# Patient Record
Sex: FEMALE | Race: WHITE | Employment: OTHER | URBAN - METROPOLITAN AREA
[De-identification: names, ages, dates, MRNs, and addresses within clinical notes are randomized per-mention and may not be internally consistent; named-entity substitution may affect disease eponyms.]

---

## 2022-02-23 ENCOUNTER — OFFICE VISIT (OUTPATIENT)
Dept: ORTHOPEDIC SURGERY | Age: 62
End: 2022-02-23
Payer: COMMERCIAL

## 2022-02-23 VITALS — WEIGHT: 140 LBS | BODY MASS INDEX: 23.32 KG/M2 | HEIGHT: 65 IN

## 2022-02-23 DIAGNOSIS — M76.821 POSTERIOR TIBIAL TENDINITIS OF RIGHT LEG: ICD-10-CM

## 2022-02-23 DIAGNOSIS — M20.21 ACQUIRED HALLUX RIGIDUS, RIGHT: Primary | ICD-10-CM

## 2022-02-23 DIAGNOSIS — M67.01 ACHILLES TENDON CONTRACTURE, RIGHT: ICD-10-CM

## 2022-02-23 DIAGNOSIS — M79.671 RIGHT FOOT PAIN: ICD-10-CM

## 2022-02-23 PROCEDURE — 99203 OFFICE O/P NEW LOW 30 MIN: CPT | Performed by: ORTHOPAEDIC SURGERY

## 2022-02-23 RX ORDER — METHYLPREDNISOLONE 4 MG/1
TABLET ORAL
Qty: 1 DOSE PACK | Refills: 0 | Status: SHIPPED | OUTPATIENT
Start: 2022-02-23 | End: 2022-03-07

## 2022-02-23 NOTE — LETTER
2/23/2022    Patient: Hilary Castillo   YOB: 1960   Date of Visit: 2/23/2022     [facility], [place]  [address] [id number]  Via Fax: [phone]    Dear Lorraine Brenner DO,      Thank you for referring Ms. Porter Mccartney to [facility] for evaluation. My notes for this consultation are attached. If you have questions, please do not hesitate to call me. I look forward to following your patient along with you.       Sincerely,    Lowanda Schilder, MD

## 2022-02-23 NOTE — PROGRESS NOTES
Cele Gardner (: 1960) is a 57 y.o. female, patient,here for evaluation of the following   Chief Complaint   Patient presents with    Foot Pain     has a bunion on the right foot and also was told that she has a wrapped tendon around her great toe         ASSESSMENT/PLAN:  Below is the assessment and plan developed based on review of pertinent history, physical exam, labs, studies, and medications. 1. Acquired hallux rigidus, right  -     REFERRAL TO PHYSICAL THERAPY  -     methylPREDNISolone (MEDROL DOSEPACK) 4 mg tablet; Take 1 Tablet by mouth Specific Days and Specific Times for 6 days, THEN 1 Tablet Specific Days and Specific Times for 6 days. As instructed on packet, Normal, Disp-1 Dose Pack, R-0  2. Right foot pain  -     XR STANDING FOOT RT MIN 3 V; Future  -     methylPREDNISolone (MEDROL DOSEPACK) 4 mg tablet; Take 1 Tablet by mouth Specific Days and Specific Times for 6 days, THEN 1 Tablet Specific Days and Specific Times for 6 days. As instructed on packet, Normal, Disp-1 Dose Pack, R-0  3. Posterior tibial tendinitis of right leg  -     REFERRAL TO PHYSICAL THERAPY  -     methylPREDNISolone (MEDROL DOSEPACK) 4 mg tablet; Take 1 Tablet by mouth Specific Days and Specific Times for 6 days, THEN 1 Tablet Specific Days and Specific Times for 6 days. As instructed on packet, Normal, Disp-1 Dose Pack, R-0  4. Achilles tendon contracture, right  -     REFERRAL TO PHYSICAL THERAPY      Patient has multiple problems at one lower extremity. 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 We discussed the treatment for hallux rigidus which is rigid soled shoes, semirigid arch supports or Baeza's extension type of insoles, activity modification, anti-inflammatory medications and we did prescribe Medrol pack today.   If the Medrol pack works, I do recommend 1 cortisone injection. Once we start thinking about surgery is cheilectomy with or without implant and eventually the end-stage treatment for first MTP joint hallux rigidus/arthritis is arthrodesis. She is not a candidate for arthrodesis at this time. I did recommend returning if she elects to proceed with 1 cortisone injection to the first MTP joint. Finally she also has pain along the posterior medial aspect of ankle and this appears to be in line with the posterior tibial tendon, she does have a good amount of contracture to the Achilles tendon with difficulty dorsiflexion when knee is extended and hindfoot corrected to neutral position. Her arch falls to the flat position when weightbearing. The pain around the medial ankle is posterior tibial tendinitis otherwise the tendon appears intact by exam.  Recommend stretching program for the Achilles tendon and posterior tibial tendon program, physical therapy referral is made today. At length discussion had with patient as she has multiple questions regarding all the problems and I spent approximately 35 minutes with this patient discussing the findings on exam, x-rays, reviewing the treatment options for each problem. No current surgical indications. Return if symptoms worsen or fail to improve, for cortizone injection. Allergies   Allergen Reactions    Flagyl [Metronidazole] Other (comments)     GI upset       Current Outpatient Medications   Medication Sig    methylPREDNISolone (MEDROL DOSEPACK) 4 mg tablet Take 1 Tablet by mouth Specific Days and Specific Times for 6 days, THEN 1 Tablet Specific Days and Specific Times for 6 days. As instructed on packet    pravastatin (PRAVACHOL) 10 mg tablet Take 1 Tab by mouth nightly. Indications: ARTERIOSCLEROTIC VASCULAR DISEASE    ALPRAZolam (XANAX) 0.25 mg tablet Take  by mouth.  aspirin 81 mg chewable tablet Take 1 Tab by mouth daily.  (Patient not taking: Reported on 2/23/2022)    B.infantis-B.ani-B.long-B.bifi 10-15 mg TbEC Take  by mouth daily. (Patient not taking: Reported on 2/23/2022)    estradiol (ESTRING) 2 mg vaginal ring Insert  into vagina now. follow package directions (Patient not taking: Reported on 2/23/2022)    nystatin-triamcinolone (MYCOLOG II) topical cream Apply  to affected area two (2) times a day. (Patient not taking: Reported on 2/23/2022)     No current facility-administered medications for this visit. No past medical history on file. Past Surgical History:   Procedure Laterality Date    HX GYN      exploratory laproscopic of uterus for possible endometriosis    HX GYN      laser cervix    HX HEENT      HX TONSIL AND ADENOIDECTOMY         Family History   Problem Relation Age of Onset    Psychiatric Disorder Mother     Heart Disease Father     Hypertension Father        Social History     Socioeconomic History    Marital status:      Spouse name: Not on file    Number of children: Not on file    Years of education: Not on file    Highest education level: Not on file   Occupational History    Not on file   Tobacco Use    Smoking status: Never Smoker    Smokeless tobacco: Never Used   Substance and Sexual Activity    Alcohol use: Yes     Alcohol/week: 5.8 standard drinks     Types: 7 Glasses of wine per week    Drug use: Not on file    Sexual activity: Yes     Partners: Male   Other Topics Concern    Not on file   Social History Narrative    Not on file     Social Determinants of Health     Financial Resource Strain:     Difficulty of Paying Living Expenses: Not on file   Food Insecurity:     Worried About Running Out of Food in the Last Year: Not on file    Harman of Food in the Last Year: Not on file   Transportation Needs:     Lack of Transportation (Medical): Not on file    Lack of Transportation (Non-Medical):  Not on file   Physical Activity:     Days of Exercise per Week: Not on file    Minutes of Exercise per Session: Not on file   Stress:     Feeling of Stress : Not on file   Social Connections:     Frequency of Communication with Friends and Family: Not on file    Frequency of Social Gatherings with Friends and Family: Not on file    Attends Buddhist Services: Not on file    Active Member of [address] [place] [address] [employer] or Organizations: Not on file    Attends Club or Organization Meetings: Not on file    Marital Status: Not on file   Intimate Partner Violence:     Fear of Current or Ex-Partner: Not on file    Emotionally Abused: Not on file    Physically Abused: Not on file    Sexually Abused: Not on file   Housing Stability:     Unable to Pay for Housing in the Last Year: Not on file    Number of [place] in the Last Year: Not on file    Unstable Housing in the Last Year: Not on file           Vitals:  Ht 5' 4.5\" (1.638 m)   Wt 140 lb (63.5 kg)   BMI 23.66 kg/m²    Body mass index is 23.66 kg/m². ROS     Positive for: Musculoskeletal (right foot )    Negative for: Constitutional, Gastrointestinal, Neurological, Skin, Genitourinary, HENT, Endocrine, Cardiovascular, Eyes, Respiratory, Psychiatric, Allergic/Imm, Heme/Lymph    Last edited by Mago Avery on 2022  8:30 AM. (History)              SUBJECTIVE/OBJECTIVE:  Madeline Valencia (: 1960)   Patient presents today with complaint of right ankle and foot pain that has been a problem for the past 7 weeks the level of pain has gotten worse. States that on Monday she went to see a podiatrist for the same problem, she was told she needed surgery for the bunion and the tendon needed to also be addressed. The patient's pain is sharp, dull, stabbing throbbing burning tight and sore sensation. Symptoms come and go and interferes with sleep. States there is some tingling weakness sensation. Every activity seems to make this worse. She states she is \"very active\" and currently of activities is making it worse.   She has tried rest and taking Mobic which made her ankle and foot feels so much better so she did more activities and seemed to have aggravated more. Because of the severe pain she was experiencing she did see the podiatrist on Monday, February 21, 2022. She is otherwise here for the same problem. She denies any history of diabetes, non-smoker. Has hypertension. Physical Exam  Pleasant well-nourished female , alert and oriented to person, time and place, no acute distress. Mostly normal gait, satisfactory weightbearing stance. Right lower extremity/ankle: Calf soft nontender, tenderness along the posterior medial lower extremity distally and above the medial malleolus in line with the posterior tibial tendon, there is minimal swelling, no ecchymosis, no erythema. There is tightness to the Achilles tendon on attempted dorsiflexion with knee extended, the tendon itself is also slightly tender towards the end at the insertion, there is a negative Richey test, negative ankle squeeze test.  Satisfactory to motion of the ankle when knee flexed. Ligaments are grossly stable. Right foot: There is pes planovalgus position to the foot, there is mild hallux valgus but more prominent first metatarsal head and medial eminence prominence that is mildly tender. The pain is worse with increased dorsiflexion plantarflexion which is limited, decreased dorsiflexion up to about 5 degrees and 10 degrees of plantarflexion and extreme position results in discomfort. The joint space is also tender on exam.  The rest of the foot is nontender which includes the lesser toes, metatarsals, mid foot tarsal bones. Ligaments are grossly stable. Contralateral foot and ankle exam, nontender, no swelling ligaments grossly stable. Normal weightbearing stance. Neurovascular exam intact for light touch sensation, cap refill, dorsalis pedis pulse palpable, flexion/extension strength 5/5.     Skin intact without open wounds, lesions or ulcers, no skin discolorations, normal warmth to skin. Imaging:    XR Results (most recent):  Results from Appointment encounter on 02/23/22    XR STANDING FOOT RT MIN 3 V    Narrative  Right foot standing AP, lateral and oblique shows no acute fractures or dislocations. There is mild hallux valgus, there is still some joint space present at the first MTP joint but prominent medial eminence and dorsal bone spur at first metatarsal head. Also some degenerative changes at the midfoot joints tarsometatarsal first and second, there is a small Suzi's. Normal bone density. An electronic signature was used to authenticate this note.   -- Faizan Ríos MD 160

## 2022-03-16 ENCOUNTER — OFFICE VISIT (OUTPATIENT)
Dept: ORTHOPEDIC SURGERY | Age: 62
End: 2022-03-16
Payer: COMMERCIAL

## 2022-03-16 DIAGNOSIS — M79.671 RIGHT FOOT PAIN: Primary | ICD-10-CM

## 2022-03-16 DIAGNOSIS — M76.821 POSTERIOR TIBIAL TENDINITIS OF RIGHT LEG: ICD-10-CM

## 2022-03-16 PROCEDURE — 97110 THERAPEUTIC EXERCISES: CPT | Performed by: PHYSICAL THERAPIST

## 2022-03-16 PROCEDURE — 97162 PT EVAL MOD COMPLEX 30 MIN: CPT | Performed by: PHYSICAL THERAPIST

## 2022-03-16 NOTE — PROGRESS NOTES
Penelope Corbett (: 1960) is a 57 y.o. Foot Pain       Patient Name: Penelope Corbett  Date:3/16/2022  : 1960  [x]  Patient  Verified  Payor: Kendra Magana / Plan: Herb Mireles / Product Type: Commerical /    Odin Burrows MD  Total Treatment Time (min): 70  Total Timed Codes (min): 45  1:1 Treatment Time ( [address] only): N/A  Visit #: 1 of 15      Treatment Area: Right foot    ASSESSMENT/PLAN:  Below is the assessment and plan developed based on review of pertinent history, physical exam, labs, studies, and medications. Patient comes in today with a diagnosis of right foot and ankle pain and presents with physical therapy deficits including range of motion, mobility, strength, flexibility, and proprioception. She will benefit from a physical therapy program to address above-mentioned deficits. Short-term goals: To become independent with today's prescribed home exercise program in 1 week. Long-term goals: To progress with a physical therapy program so that patient demonstrates improved functional ankle and first toe range of motion and may transition back into a regular exercise program reporting less than 3/10 foot and ankle pain with no limitation in the next 4 to 6 weeks. Patient will be seen 1-2 a week for up to 10 visits  with focus on progressive restoration of range of motion and strength, balance, and functional mobility. Therapeutic applications will include but are not limited to:Manual therapy, joint mobilization, myofascial release, therapeutic exercises. Modalities including ultrasound and electric stimulation heat and ice. Kinesiotape and Thompson taping for joint reeducation and approximation of tissue for neuromuscular reeducation. 1. Right foot pain  2. Posterior tibial tendinitis of right leg      Return in about 1 week (around 3/23/2022). SUBJECTIVE:  HPI  Patient comes in today complaining of a several month history of mostly right toe and foot pain. She recently purchased some new supportive shoes with orthotics and reports some improvement. Typically she has difficulty walking barefoot. She has not been able to exercise regularly since January because of her foot pain. Patient would like to get back to a regular walking program, yoga, hip class, and dance class. She does have exercise equipment at home. She also has a condo at [facility] I would like to be able to walk barefoot in the sand. Patient is  and retired. Please see patient's medical chart for detailed list of current medications as well as significant past medical history. OBJECTIVE:  Evaluation(30 mins)  Mostly normal gait, satisfactory weightbearing stance. Relative difficulty raising on toes on the right lower extremity.     Right lower extremity/ankle: Ankle PROM measures 10 degrees dorsiflexion and 45 degrees of plantarflexion. Ankle inversion and eversion motion is within normal limits. Calf is nontender. Negative with Richey test.  Posterior compartment is soft. Severe flexibility restriction to gastroc. Moderate flexibility restriction 2 soleus. Mild tenderness to Achilles tendon and 2 posterior tibialis at ankle and foot. Ankle is stable with four-way ligamentous testing. Relative difficulty with single-leg stance. Weakness with heel raise. Right foot: There is pes planovalgus position to the foot, there is mild hallux valgus  first MTP joint hypomobile and tender. She is limited with 15 degrees of toe extension and 30 degrees of toe flexion. Painful into endrange toe extension. Some mild crepitus to the MTP joint. The rest of the foot is nontender which includes the lesser toes, metatarsals, mid foot tarsal bones. Ligaments are grossly stable.     Contralateral foot and ankle exam, nontender, no swelling ligaments grossly stable.   Normal weightbearing stance.     Neurovascular exam intact for light touch sensation, cap refill, dorsalis pedis pulse palpable, flexion/extension strength 5/5.     Foot and ankle ability measure: 47%    Manual(mins10)  First MTP mobilization with gentle passive range of motion. Rear foot mobilization with emphasis into dorsiflexion stretching. Exercise(mins 15)  With today's interventions we initiated exercises for foot intrinsic strengthening and range of motion, ankle strengthening and range of motion, and flexibility for patient perform at home on a daily basis. Today's exercises include resisted band ankle inversion, marble pickup, calf stretch, soleus stretch, and ankle alphabet. Game ready ice posttreatment. An electronic signature was used to authenticate this note.   -- Serjio Baeza, PT

## 2022-03-22 ENCOUNTER — OFFICE VISIT (OUTPATIENT)
Dept: ORTHOPEDIC SURGERY | Age: 62
End: 2022-03-22
Payer: COMMERCIAL

## 2022-03-22 DIAGNOSIS — M79.671 RIGHT FOOT PAIN: Primary | ICD-10-CM

## 2022-03-22 DIAGNOSIS — M76.821 POSTERIOR TIBIAL TENDINITIS OF RIGHT LEG: ICD-10-CM

## 2022-03-22 PROCEDURE — 97110 THERAPEUTIC EXERCISES: CPT | Performed by: PHYSICAL THERAPIST

## 2022-03-22 PROCEDURE — 97140 MANUAL THERAPY 1/> REGIONS: CPT | Performed by: PHYSICAL THERAPIST

## 2022-03-22 PROCEDURE — 97035 APP MDLTY 1+ULTRASOUND EA 15: CPT | Performed by: PHYSICAL THERAPIST

## 2022-03-22 NOTE — PROGRESS NOTES
Katty Lopez (: 1960) is a 57 y.o. Foot Pain       Patient Name: Katty Lopez  Date:3/22/2022  : 1960  [x]  Patient  Verified  Payor: Luda Castillo / Plan: Jostin Para / Product Type: Commerical /    Vernestine MD Canelo  Total Treatment Time (min): 65  Total Timed Codes (min): 53  1:1 Treatment Time ( [address] only): N/A  Visit #: 2 of 15      Treatment Area: Right foot and ankle    ASSESSMENT/PLAN:  Below is the assessment and plan developed based on review of pertinent history, physical exam, labs, studies, and medications. Patient appears compliant with her home exercises. She did need some feedback on proper technique. Tolerates today's exercise progression without increased baseline foot or ankle pain. Continue with current plan of care and progress towards long-term goals. 1. Right foot pain  2. Posterior tibial tendinitis of right leg      Return in about 1 week (around 3/29/2022) for continued therapy for foot. SUBJECTIVE:  HPI  Doing her exercises. Ankle may feel little better. OBJECTIVE:  Manual(mins 15)  Extensive soft tissue massage/myofascial release to posterior tibialis combined with passive release. Soft tissue massage myofascial release to gastroc and soleus. Subtalar mobilization with passive range of motion. First MTP mobilization with passive range of motion. Modalities  Ultrasound (8 minutes) posterior tibialis just posterior to medial malleolus at 50% duty cycle and 2.0 MHz.     Game ready ice posttreatment    Exercise(mins 30)  See below exercise chart      PT Exercise Log         Activity/Exercise Date  22     Activity/Exercise  All exercises were supervised with verbal and tactile cues provided were necessary to perform the exercises with 100% accuracy       Recumbent bike [x]       Calf stretch/Slant [x]  three-way   Shuttle heel raise     [x]      Single-leg stance/foam   [x]    [place] board   [x]      Resisted ankle DF [] Resisted ankle inversion   []    Resisted ankle eversion   [x] Prone   Resisted ankle PF   []    Cup Walk   []    Treadmill   []      DF belt stretch []        Marbles []    Stools scoot []    Balance board []    Step downs   []      Lower* []    Bungee walk   []    Fitter   []    Balance board   [x]                          []                              []                            []       An electronic signature was used to authenticate this note.   -- Chadd Wood, PT

## 2022-03-28 ENCOUNTER — OFFICE VISIT (OUTPATIENT)
Dept: ORTHOPEDIC SURGERY | Age: 62
End: 2022-03-28
Payer: COMMERCIAL

## 2022-03-28 DIAGNOSIS — M79.671 RIGHT FOOT PAIN: Primary | ICD-10-CM

## 2022-03-28 DIAGNOSIS — M76.821 POSTERIOR TIBIAL TENDINITIS OF RIGHT LEG: ICD-10-CM

## 2022-03-28 PROCEDURE — 97035 APP MDLTY 1+ULTRASOUND EA 15: CPT | Performed by: PHYSICAL THERAPIST

## 2022-03-28 PROCEDURE — 97110 THERAPEUTIC EXERCISES: CPT | Performed by: PHYSICAL THERAPIST

## 2022-03-28 PROCEDURE — 97140 MANUAL THERAPY 1/> REGIONS: CPT | Performed by: PHYSICAL THERAPIST

## 2022-03-28 NOTE — PROGRESS NOTES
Kenan Mendez (: 1960) is a 57 y.o. Foot Pain       Patient Name: Kenan Mendez  Date:3/28/2022  : 1960  [x]  Patient  Verified  Payor: Halina Qiu / Plan: [employer] / Product Type: Commerical /    Stephanie Shaver MD  Total Treatment Time (min): 65  Total Timed Codes (min): 53  1:1 Treatment Time ( [address] only): N/A  Visit #: 3 of 15      Treatment Area: Right foot and ankle    ASSESSMENT/PLAN:  Below is the assessment and plan developed based on review of pertinent history, physical exam, labs, studies, and medications. Patient responding well to current plan of care. Benefiting from modalities and soft tissue work to posterior tibialis. Continue with current plan of care and progress towards long-term goals. 1. Right foot pain  2. Posterior tibial tendinitis of right leg      Return in about 1 week (around 2022) for continued therapy for foot. SUBJECTIVE:  HPI  Ankle and leg felt amazing after last treatment. OBJECTIVE:  Manual(mins 15)  Extensive soft tissue massage/myofascial release to posterior tibialis combined with passive release. Soft tissue massage myofascial release to gastroc and soleus. Subtalar mobilization with passive range of motion. K tape to posterior tibialis per protocol. Modalities  Ultrasound (8 minutes) posterior tibialis just posterior to medial malleolus at 50% duty cycle and 2.0 MHz.     Game ready ice posttreatment    Exercise(mins 30)  See below exercise chart      PT Exercise Log         Activity/Exercise Date  22     Activity/Exercise  All exercises were supervised with verbal and tactile cues provided were necessary to perform the exercises with 100% accuracy       Recumbent bike [x]       Calf stretch/Slant [x]  three-way   Shuttle heel raise     [x]      Single-leg stance/foam   [x]    [place] board   [x]      Resisted ankle DF []    Resisted ankle inversion   [x] Prone   Resisted ankle eversion   [x]    Resisted ankle PF   []    Cup Walk   []    Treadmill   []      DF belt stretch [x]        Marbles []    Stools scoot []    Balance board []    Step downs   []      Lower* []    Bungee walk   []    Fitter   []    Balance board   [x]                          []                              []                            []       An electronic signature was used to authenticate this note.   -- Rey Kennedy, PT

## 2022-04-04 ENCOUNTER — OFFICE VISIT (OUTPATIENT)
Dept: ORTHOPEDIC SURGERY | Age: 62
End: 2022-04-04
Payer: COMMERCIAL

## 2022-04-04 DIAGNOSIS — M79.671 RIGHT FOOT PAIN: Primary | ICD-10-CM

## 2022-04-04 DIAGNOSIS — M76.821 POSTERIOR TIBIAL TENDINITIS OF RIGHT LEG: ICD-10-CM

## 2022-04-04 PROCEDURE — 97110 THERAPEUTIC EXERCISES: CPT | Performed by: PHYSICAL THERAPIST

## 2022-04-04 PROCEDURE — 97140 MANUAL THERAPY 1/> REGIONS: CPT | Performed by: PHYSICAL THERAPIST

## 2022-04-04 NOTE — PROGRESS NOTES
Cheryln Lefort (: 1960) is a 57 y.o. Foot Pain       Patient Name: Cheryln Lefort  TSKT:3/9/[birth year]27  : 1960  [x]  Patient  Verified  Payor: Cassie Montanez / Plan: Baldo Colvin / Product Type: Commerical /    Gina Abdalla MD  Total Treatment Time (min): 60  Total Timed Codes (min): 45  1:1 Treatment Time ([address] only): N/A  Visit #: 4 of 15      Treatment Area: Right foot and ankle    ASSESSMENT/PLAN:  Below is the assessment and plan developed based on review of pertinent history, physical exam, labs, studies, and medications. No real tenderness to posterior tibialis at ankle or foot. Still has some tenderness and fascial restriction along medial calf. She will be out of town next week. She will continue with at home strengthening and flexibility exercises. We will follow-up in 2 weeks. 1. Right foot pain  2. Posterior tibial tendinitis of right leg      Return in about 2 weeks (around 2022) for continued therapy for foot. SUBJECTIVE:  Foot Pain      Ankle and leg felt amazing after last treatment. OBJECTIVE:  Manual(mins 15)  Extensive soft tissue massage/myofascial release to posterior tibialis combined with passive release. Soft tissue massage myofascial release to gastroc and soleus. Subtalar mobilization with passive range of motion. K tape to posterior tibialis per protocol.     Modalities      Game ready ice posttreatment    Exercise(mins 30)  See below exercise chart      PT Exercise Log         Activity/Exercise Date  22     Activity/Exercise  All exercises were supervised with verbal and tactile cues provided were necessary to perform the exercises with 100% accuracy       Recumbent bike [x]       Calf stretch/Slant [x]  three-way   Shuttle heel raise     [x]      Single-leg stance/foam   [x]    [place] board   [x]      Resisted ankle DF []    Resisted ankle inversion   [x] Prone   Resisted ankle eversion   [x]    Resisted ankle PF   []    Cup Walk []    Treadmill   []      DF belt stretch [x]        Marbles []    Stools scoot []    Balance board []    Step downs   []      Lower* []    Bungee walk   []    Fitter   []    Balance board   [x]                          []                              []                            []       An electronic signature was used to authenticate this note.   -- Naomi Arthur, PT

## 2022-04-21 ENCOUNTER — OFFICE VISIT (OUTPATIENT)
Dept: ORTHOPEDIC SURGERY | Age: 62
End: 2022-04-21
Payer: COMMERCIAL

## 2022-04-21 DIAGNOSIS — M76.821 POSTERIOR TIBIAL TENDINITIS OF RIGHT LEG: ICD-10-CM

## 2022-04-21 DIAGNOSIS — M79.671 RIGHT FOOT PAIN: Primary | ICD-10-CM

## 2022-04-21 PROCEDURE — 97110 THERAPEUTIC EXERCISES: CPT | Performed by: PHYSICAL THERAPIST

## 2022-04-21 PROCEDURE — 97035 APP MDLTY 1+ULTRASOUND EA 15: CPT | Performed by: PHYSICAL THERAPIST

## 2022-04-21 PROCEDURE — 97140 MANUAL THERAPY 1/> REGIONS: CPT | Performed by: PHYSICAL THERAPIST

## 2022-04-21 NOTE — PROGRESS NOTES
Lamar Jennings (: 1960) is a 60 y.o. Foot Pain       Patient Name: Lamar Jennings  Date:2022  : 1960  [x]  Patient  Verified  Payor: Liset Suárez / Plan: Parris Red / Product Type: Commerical /    Santy Maloney MD  Total Treatment Time (min): 60  Total Timed Codes (min): 60  1:1 Treatment Time ([address] only): N/A  Visit #: 5 of 15      Treatment Area: Right foot and ankle    ASSESSMENT/PLAN:  Below is the assessment and plan developed based on review of pertinent history, physical exam, labs, studies, and medications. Mostly tender along her posterior tibialis msLisa LUCAS referred her to [facility], for at home massage. She will continue with at home strengthening and flexibility exercises. We will have her f/u once a week for 2 more weeks and progress toward ltgs. 1. Right foot pain  2. Posterior tibial tendinitis of right leg      Return in about 1 week (around 2022) for continued therapy for foot. SUBJECTIVE:  Foot Pain      Ankle and leg felt amazing after last treatment. OBJECTIVE:  Manual(mins 15)  Extensive soft tissue massage/myofascial release to posterior tibialis combined with passive release. Soft tissue massage myofascial release to gastroc and soleus. Subtalar mobilization with passive range of motion. K tape to posterior tibialis per protocol. Modalities  US(8 mins) Mid posterior tibialis at 50 percent and 1. 0MHZ.     Game ready ice posttreatment    Exercise(mins 30)  See below exercise chart      PT Exercise Log         Activity/Exercise Date  22     Activity/Exercise  All exercises were supervised with verbal and tactile cues provided were necessary to perform the exercises with 100% accuracy       Recumbent bike [x]       Calf stretch/Slant [x]  three-way   Shuttle heel raise     [x]      Single-leg stance/foam   [x]    [place] board   [x]      Resisted ankle DF []    Resisted ankle inversion   [x] Prone   Resisted ankle eversion   [x]    Resisted ankle PF   []    Cup Walk   []    Treadmill   []      DF belt stretch [x]        Marbles []    Stools scoot []    Balance board []    Step downs   []      Lower* []    Bungee walk   []    Fitter   []    Balance board   [x]                          []                              []                            []       An electronic signature was used to authenticate this note.   -- Marie Johnson, PT

## 2023-07-13 PROBLEM — F39 MOOD DISORDER (HCC): Status: ACTIVE | Noted: 2023-07-13

## 2023-12-12 LAB — MAMMOGRAPHY, EXTERNAL: NORMAL

## 2024-01-22 RX ORDER — ROSUVASTATIN CALCIUM 5 MG/1
5 TABLET, COATED ORAL DAILY
Qty: 90 TABLET | Refills: 0 | OUTPATIENT
Start: 2024-01-22

## 2024-04-23 SDOH — ECONOMIC STABILITY: INCOME INSECURITY: HOW HARD IS IT FOR YOU TO PAY FOR THE VERY BASICS LIKE FOOD, HOUSING, MEDICAL CARE, AND HEATING?: NOT HARD AT ALL

## 2024-04-23 SDOH — ECONOMIC STABILITY: HOUSING INSECURITY
IN THE LAST 12 MONTHS, WAS THERE A TIME WHEN YOU DID NOT HAVE A STEADY PLACE TO SLEEP OR SLEPT IN A SHELTER (INCLUDING NOW)?: NO

## 2024-04-23 SDOH — ECONOMIC STABILITY: FOOD INSECURITY: WITHIN THE PAST 12 MONTHS, THE FOOD YOU BOUGHT JUST DIDN'T LAST AND YOU DIDN'T HAVE MONEY TO GET MORE.: NEVER TRUE

## 2024-04-23 SDOH — ECONOMIC STABILITY: TRANSPORTATION INSECURITY
IN THE PAST 12 MONTHS, HAS LACK OF TRANSPORTATION KEPT YOU FROM MEETINGS, WORK, OR FROM GETTING THINGS NEEDED FOR DAILY LIVING?: NO

## 2024-04-23 SDOH — ECONOMIC STABILITY: FOOD INSECURITY: WITHIN THE PAST 12 MONTHS, YOU WORRIED THAT YOUR FOOD WOULD RUN OUT BEFORE YOU GOT MONEY TO BUY MORE.: NEVER TRUE

## 2024-04-25 ENCOUNTER — OFFICE VISIT (OUTPATIENT)
Age: 64
End: 2024-04-25
Payer: MEDICARE

## 2024-04-25 VITALS
HEIGHT: 65 IN | SYSTOLIC BLOOD PRESSURE: 125 MMHG | BODY MASS INDEX: 19.06 KG/M2 | WEIGHT: 114.4 LBS | DIASTOLIC BLOOD PRESSURE: 75 MMHG | RESPIRATION RATE: 18 BRPM | OXYGEN SATURATION: 98 % | HEART RATE: 88 BPM | TEMPERATURE: 98.8 F

## 2024-04-25 DIAGNOSIS — R79.89 LOW SERUM CORTISOL LEVEL: ICD-10-CM

## 2024-04-25 DIAGNOSIS — M85.89 OSTEOPENIA OF MULTIPLE SITES: ICD-10-CM

## 2024-04-25 DIAGNOSIS — D69.6 LOW PLATELET COUNT (HCC): ICD-10-CM

## 2024-04-25 DIAGNOSIS — F41.8 SITUATIONAL ANXIETY: ICD-10-CM

## 2024-04-25 DIAGNOSIS — F51.01 PRIMARY INSOMNIA: ICD-10-CM

## 2024-04-25 DIAGNOSIS — I10 PRIMARY HYPERTENSION: ICD-10-CM

## 2024-04-25 DIAGNOSIS — E78.00 PURE HYPERCHOLESTEROLEMIA: Primary | ICD-10-CM

## 2024-04-25 DIAGNOSIS — R79.89 ELEVATED FERRITIN: ICD-10-CM

## 2024-04-25 PROBLEM — F39 MOOD DISORDER (HCC): Status: RESOLVED | Noted: 2023-07-13 | Resolved: 2024-04-25

## 2024-04-25 PROBLEM — Z86.39 HISTORY OF HIGH CHOLESTEROL: Status: ACTIVE | Noted: 2022-02-01

## 2024-04-25 PROBLEM — M85.80 OSTEOPENIA: Status: ACTIVE | Noted: 2023-10-02

## 2024-04-25 PROCEDURE — 1036F TOBACCO NON-USER: CPT | Performed by: INTERNAL MEDICINE

## 2024-04-25 PROCEDURE — 3078F DIAST BP <80 MM HG: CPT | Performed by: INTERNAL MEDICINE

## 2024-04-25 PROCEDURE — 3017F COLORECTAL CA SCREEN DOC REV: CPT | Performed by: INTERNAL MEDICINE

## 2024-04-25 PROCEDURE — G8427 DOCREV CUR MEDS BY ELIG CLIN: HCPCS | Performed by: INTERNAL MEDICINE

## 2024-04-25 PROCEDURE — G8400 PT W/DXA NO RESULTS DOC: HCPCS | Performed by: INTERNAL MEDICINE

## 2024-04-25 PROCEDURE — G8420 CALC BMI NORM PARAMETERS: HCPCS | Performed by: INTERNAL MEDICINE

## 2024-04-25 PROCEDURE — 3074F SYST BP LT 130 MM HG: CPT | Performed by: INTERNAL MEDICINE

## 2024-04-25 PROCEDURE — 1123F ACP DISCUSS/DSCN MKR DOCD: CPT | Performed by: INTERNAL MEDICINE

## 2024-04-25 PROCEDURE — 99204 OFFICE O/P NEW MOD 45 MIN: CPT | Performed by: INTERNAL MEDICINE

## 2024-04-25 PROCEDURE — 1090F PRES/ABSN URINE INCON ASSESS: CPT | Performed by: INTERNAL MEDICINE

## 2024-04-25 RX ORDER — ESTRADIOL 10 UG/1
INSERT VAGINAL
COMMUNITY
Start: 2019-08-26

## 2024-04-25 RX ORDER — ALPRAZOLAM 0.5 MG/1
0.5 TABLET ORAL PRN
COMMUNITY

## 2024-04-25 RX ORDER — TRAZODONE HYDROCHLORIDE 50 MG/1
50 TABLET ORAL NIGHTLY
Qty: 90 TABLET | Refills: 1 | Status: SHIPPED | OUTPATIENT
Start: 2024-04-25

## 2024-04-25 ASSESSMENT — PATIENT HEALTH QUESTIONNAIRE - PHQ9
1. LITTLE INTEREST OR PLEASURE IN DOING THINGS: NOT AT ALL
SUM OF ALL RESPONSES TO PHQ QUESTIONS 1-9: 0
2. FEELING DOWN, DEPRESSED OR HOPELESS: NOT AT ALL
SUM OF ALL RESPONSES TO PHQ9 QUESTIONS 1 & 2: 0

## 2024-04-25 ASSESSMENT — ENCOUNTER SYMPTOMS
COUGH: 0
ABDOMINAL PAIN: 0
SHORTNESS OF BREATH: 0

## 2024-04-25 NOTE — ASSESSMENT & PLAN NOTE
DEXA 10/2023  Spine -1.1  LFN -1.3  RFN -1.5    Continue lifestyle measures, recheck 2-3 year tejas

## 2024-04-25 NOTE — ASSESSMENT & PLAN NOTE
She had low cortisol testing from functional medicine doc  Suggested having endo eval before starting supplements

## 2024-04-25 NOTE — PROGRESS NOTES
Chief Complaint   Patient presents with    New Patient    Establish Care    Abdominal Pain     Blood pressure 125/75, pulse 88, temperature 98.8 °F (37.1 °C), temperature source Oral, resp. rate 18, height 1.651 m (5' 5\"), weight 51.9 kg (114 lb 6.4 oz), SpO2 98 %.    
CBC with Auto Differential; Future         Return in about 6 months (around 10/25/2024) for annual medicare wellness exam, or sooner as needed.       SUBJECTIVE/OBJECTIVE:  New patient to me, here to establish care  Visit spent in discussion of past and current medical conditions, with statuses as documented in Assessment and Plan section    She has really been focused on improving her health over past 2 years. She lost weight by cutting out alcohol, sugar, eating more plant based and exercising    Also seeing a functional medicine provider Dr Butler      Review of Systems   Constitutional:  Negative for chills and fever.   Respiratory:  Negative for cough and shortness of breath.    Cardiovascular:  Negative for chest pain, palpitations and leg swelling.   Gastrointestinal:  Negative for abdominal pain.   Skin:  Negative for rash.   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 Negative for dysphoric mood.           Physical Exam  Constitutional:       General: She is not in acute distress.     Appearance: Normal appearance. She is not ill-appearing.   HENT:      Head: Normocephalic and atraumatic.   Eyes:      Conjunctiva/sclera: Conjunctivae normal.   Cardiovascular:      Rate and Rhythm: Normal rate and regular rhythm.      Heart sounds: No murmur heard.  Pulmonary:      Effort: Pulmonary effort is normal. No respiratory distress.      Breath sounds: Normal breath sounds.   Musculoskeletal:      Cervical back: Normal range of motion.      Right lower leg: No edema.      Left lower leg: No edema.   Skin:     General: Skin is warm and dry.   Neurological:      General: No focal deficit present.      Mental Status: She is alert and oriented to person, place, and time. Mental status is at baseline.      Gait: Gait normal.   Psychiatric:         Mood and Affect: Mood normal.         Behavior: Behavior normal.          Vitals:    04/25/24 1427 
(4) no limitation

## 2024-04-25 NOTE — ASSESSMENT & PLAN NOTE
Persistent insomnia, OTCs werent helpful nor hydroxyzine  Suggest trial of trazodone, New med dosing and potential side effects discussed

## 2024-04-25 NOTE — ASSESSMENT & PLAN NOTE
CAC 2022 was 4, started on rosuvastatin 5mg in 2022  Had normal stress testing and echo at that time as well with Dr Newman  [place] she was getting some fatigue and leg pains related to the statin which then improved with stopping about a month ago  Following plant based diet and exercising regularly  Will get new lipid profile now off the statin and reassess

## 2024-04-29 ENCOUNTER — HOSPITAL ENCOUNTER (OUTPATIENT)
Facility: HOSPITAL | Age: 64
Discharge: HOME | End: 2024-05-02

## 2024-04-29 DIAGNOSIS — D69.6 LOW PLATELET COUNT (HCC): ICD-10-CM

## 2024-04-29 DIAGNOSIS — E78.00 PURE HYPERCHOLESTEROLEMIA: ICD-10-CM

## 2024-04-29 DIAGNOSIS — R79.89 ELEVATED FERRITIN: ICD-10-CM

## 2024-04-29 LAB
BASOPHILS # BLD: 0 K/UL (ref 0–0.1)
BASOPHILS NFR BLD: 1 % (ref 0–1)
CHOLEST SERPL-MCNC: 143 MG/DL
DIFFERENTIAL METHOD BLD: ABNORMAL
EOSINOPHIL # BLD: 0.1 K/UL (ref 0–0.4)
EOSINOPHIL NFR BLD: 4 % (ref 0–7)
ERYTHROCYTE [DISTWIDTH] IN BLOOD BY AUTOMATED COUNT: 12.2 % (ref 11.5–14.5)
HCT VFR BLD AUTO: 39.7 % (ref 35–47)
HDLC SERPL-MCNC: 50 MG/DL
HDLC SERPL: 2.9 (ref 0–5)
HGB BLD-MCNC: 13.7 G/DL (ref 11.5–16)
IMM GRANULOCYTES # BLD AUTO: 0 K/UL (ref 0–0.04)
IMM GRANULOCYTES NFR BLD AUTO: 0 % (ref 0–0.5)
LDLC SERPL CALC-MCNC: 78.8 MG/DL (ref 0–100)
LYMPHOCYTES # BLD: 1 K/UL (ref 0.8–3.5)
LYMPHOCYTES NFR BLD: 35 % (ref 12–49)
MCH RBC QN AUTO: 32.5 PG (ref 26–34)
MCHC RBC AUTO-ENTMCNC: 34.5 G/DL (ref 30–36.5)
MCV RBC AUTO: 94.1 FL (ref 80–99)
MONOCYTES # BLD: 0.2 K/UL (ref 0–1)
MONOCYTES NFR BLD: 8 % (ref 5–13)
NEUTS SEG # BLD: 1.5 K/UL (ref 1.8–8)
NEUTS SEG NFR BLD: 52 % (ref 32–75)
NRBC # BLD: 0 K/UL (ref 0–0.01)
NRBC BLD-RTO: 0 PER 100 WBC
PLATELET # BLD AUTO: 131 K/UL (ref 150–400)
PMV BLD AUTO: 11.9 FL (ref 8.9–12.9)
RBC # BLD AUTO: 4.22 M/UL (ref 3.8–5.2)
TRIGL SERPL-MCNC: 71 MG/DL
VLDLC SERPL CALC-MCNC: 14.2 MG/DL
WBC # BLD AUTO: 2.9 K/UL (ref 3.6–11)

## 2024-05-23 LAB
HFE GENE MUT ANL BLD/T: NORMAL
REVIEWED BY: NORMAL

## 2024-08-31 LAB
HBA1C MFR BLD HPLC: 5.1 %
LDL CHOLESTEROL, EXTERNAL: 83

## 2024-10-27 SDOH — HEALTH STABILITY: PHYSICAL HEALTH: ON AVERAGE, HOW MANY MINUTES DO YOU ENGAGE IN EXERCISE AT THIS LEVEL?: 60 MIN

## 2024-10-27 SDOH — HEALTH STABILITY: PHYSICAL HEALTH: ON AVERAGE, HOW MANY DAYS PER WEEK DO YOU ENGAGE IN MODERATE TO STRENUOUS EXERCISE (LIKE A BRISK WALK)?: 4 DAYS

## 2024-10-27 ASSESSMENT — LIFESTYLE VARIABLES
HOW MANY STANDARD DRINKS CONTAINING ALCOHOL DO YOU HAVE ON A TYPICAL DAY: PATIENT DOES NOT DRINK
HOW OFTEN DO YOU HAVE A DRINK CONTAINING ALCOHOL: 1
HOW OFTEN DO YOU HAVE A DRINK CONTAINING ALCOHOL: NEVER
HOW OFTEN DO YOU HAVE SIX OR MORE DRINKS ON ONE OCCASION: 1
HOW MANY STANDARD DRINKS CONTAINING ALCOHOL DO YOU HAVE ON A TYPICAL DAY: 0

## 2024-10-27 ASSESSMENT — PATIENT HEALTH QUESTIONNAIRE - PHQ9
SUM OF ALL RESPONSES TO PHQ9 QUESTIONS 1 & 2: 0
1. LITTLE INTEREST OR PLEASURE IN DOING THINGS: NOT AT ALL
SUM OF ALL RESPONSES TO PHQ QUESTIONS 1-9: 0
2. FEELING DOWN, DEPRESSED OR HOPELESS: NOT AT ALL
SUM OF ALL RESPONSES TO PHQ QUESTIONS 1-9: 0

## 2024-10-30 ENCOUNTER — OFFICE VISIT (OUTPATIENT)
Age: 64
End: 2024-10-30
Payer: MEDICARE

## 2024-10-30 VITALS
SYSTOLIC BLOOD PRESSURE: 137 MMHG | RESPIRATION RATE: 16 BRPM | WEIGHT: 114.2 LBS | HEIGHT: 64 IN | HEART RATE: 74 BPM | DIASTOLIC BLOOD PRESSURE: 82 MMHG | OXYGEN SATURATION: 98 % | BODY MASS INDEX: 19.5 KG/M2 | TEMPERATURE: 97.6 F

## 2024-10-30 DIAGNOSIS — Z00.00 WELCOME TO MEDICARE PREVENTIVE VISIT: Primary | ICD-10-CM

## 2024-10-30 DIAGNOSIS — D69.6 THROMBOCYTOPENIA (HCC): ICD-10-CM

## 2024-10-30 DIAGNOSIS — E78.00 PURE HYPERCHOLESTEROLEMIA: ICD-10-CM

## 2024-10-30 DIAGNOSIS — Z12.11 COLON CANCER SCREENING: ICD-10-CM

## 2024-10-30 DIAGNOSIS — J30.2 SEASONAL ALLERGIES: ICD-10-CM

## 2024-10-30 DIAGNOSIS — M85.89 OSTEOPENIA OF MULTIPLE SITES: ICD-10-CM

## 2024-10-30 PROBLEM — R79.89 LOW SERUM CORTISOL LEVEL: Status: RESOLVED | Noted: 2024-04-25 | Resolved: 2024-10-30

## 2024-10-30 PROBLEM — Z14.8 HEMOCHROMATOSIS CARRIER: Status: ACTIVE | Noted: 2024-10-30

## 2024-10-30 PROCEDURE — 93005 ELECTROCARDIOGRAM TRACING: CPT | Performed by: INTERNAL MEDICINE

## 2024-10-30 NOTE — ASSESSMENT & PLAN NOTE
Mildly decreased WBCs and platelets with normal hemoglobin, no symptoms of concern, stable over past year+  Getting labs monitored with functional med every 6 months, if not progressing does not likely need heme consult

## 2024-10-30 NOTE — PATIENT INSTRUCTIONS
medicines. These can increase your chances of quitting for good. Quitting is one of the most important things you can do to protect your heart. It is never too late to quit. Try to avoid secondhand smoke too.     Stay at a weight that's healthy for you. Talk to your doctor if you need help losing weight.     Try to get 7 to 9 hours of sleep each night.     Limit alcohol to 2 drinks a day for men and 1 drink a day for women. Too much alcohol can cause health problems.     Manage other health problems such as diabetes, high blood pressure, and high cholesterol. If you think you may have a problem with alcohol or drug use, talk to your doctor.   Medicines    Take your medicines exactly as prescribed. Call your doctor if you think you are having a problem with your medicine.     If your doctor recommends aspirin, take the amount directed each day. Make sure you take aspirin and not another kind of pain reliever, such as acetaminophen (Tylenol).   When should you call for help?   Call 911 if you have symptoms of a heart attack. These may include:    Chest pain or pressure, or a strange feeling in the chest.     Sweating.     Shortness of breath.     Pain, pressure, or a strange feeling in the back, neck, jaw, or upper belly or in one or both shoulders or arms.     Lightheadedness or sudden weakness.     A fast or irregular heartbeat.   After you call 911, the  may tell you to chew 1 adult-strength or 2 to 4 low-dose aspirin. Wait for an ambulance. Do not try to drive yourself.  Watch closely for changes in your health, and be sure to contact your doctor if you have any problems.  Where can you learn more?  Go to https://www.[employer].net/patientEd and enter F075 to learn more about \"A Healthy Heart: Care Instructions.\"  Current as of: June 24, 2023  Content Version: 14.2  © 2024 [employer].   Care instructions adapted under license by [employer]. If you have questions about a medical condition or

## 2024-10-30 NOTE — PROGRESS NOTES
Judgment normal.                 Allergies   Allergen Reactions    Metronidazole Other (See Comments)     GI upset  Other reaction(s): GI Intolerance  GI upset       Prior to Visit Medications    Medication Sig Taking? Authorizing Provider   IMVEXXY MAINTENANCE PACK 10 MCG INST  Yes Jose Luis Avelar MD   ALPRAZolam (XANAX) 0.5 MG tablet Take 1 tablet by mouth as needed. Yes Jose Luis Avelar MD   vitamin D (ERGOCALCIFEROL) 1.25 MG (43747 UT) CAPS capsule TAKE 1 CAPSULE BY MOUTH ONCE WEEKLY 90 DAYS Yes sMelany MD   Cobalamin Combinations (B-12) 100-5000 MCG SUBL Take 3,000 mcg by mouth daily Patient is taking twice a week only Yes Jose Luis Avelar MD   Rosuvastatin Calcium 5 MG CPSP 4 days per week Yes Jose Luis Avelar MD       CareTeam (Including outside providers/suppliers regularly involved in providing care):   Patient Care Team:  Renetta Elmore MD as PCP - General (Internal Medicine)  Renetta Elmore MD as PCP - Empaneled Provider  Diana Gaytan MD as Physician  Mercedes Jones MD (Obstetrics & Gynecology)  Marlon Butler MD (Emergency Medicine)  Di Newman MD as Consulting Physician (Cardiothoracic Surgery)      Reviewed and updated this visit:  Tobacco  Allergies  Meds  Problems  Med Hx  Surg Hx  Soc Hx  Fam Hx

## 2024-10-30 NOTE — ASSESSMENT & PLAN NOTE
CAC 2022 was 4, started on rosuvastatin 5mg in 2022  Had normal stress testing and echo at that time as well with Dr Newman  Can tolerate meds at every other day regimen, recent lipid panel reviewed and WNL